# Patient Record
(demographics unavailable — no encounter records)

---

## 2025-03-12 NOTE — HISTORY OF PRESENT ILLNESS
[TextBox_4] : new pt presents for annual WWE  c/o vaginal itching and odor, similar to prior visit  sexually active  declines STD screening  using slynd for contraception

## 2025-03-12 NOTE — PHYSICAL EXAM
[Chaperone Present] : A chaperone was present in the examining room during all aspects of the physical examination [FreeTextEntry2] : [email or web address]

## 2025-04-28 NOTE — PHYSICAL EXAM
[Chaperone Declined] : Chaperone offered however refused by patient, [MA] : MA [FreeTextEntry2] : Kira [Normal] : uterus [No Bleeding] : there was no active vaginal bleeding [Uterine Adnexae] : were not tender and not enlarged

## 2025-04-28 NOTE — CHIEF COMPLAINT
[Urgent Visit] : Urgent Visit [FreeTextEntry1] : 23 y/o female G0, LMP amenorrhea 2/2 OCP presents for an urgent visit c/o vaginal itch and brown discharge.  treated for BV 1 mth ago w flagyl.   On Doxy for acne.   ROS:  Denies fever/chills, HA, Cough/sore throat, CP, SOB, N/V, Diarrhea/Constipation, Pelvic pain, dysuria, urinary urgency and burning, irregular vaginal bleeding.